# Patient Record
(demographics unavailable — no encounter records)

---

## 2024-10-21 NOTE — HISTORY OF PRESENT ILLNESS
[FreeTextEntry1] : 42 yo here for av. She has a mirena iud since  2019. She has no complaints, rare cramping.  She has occ irregular Bm, no h/o ibs

## 2024-10-21 NOTE — PHYSICAL EXAM
[Chaperone Declined] : Patient declined chaperone [Appropriately responsive] : appropriately responsive [Alert] : alert [No Acute Distress] : no acute distress [No Lymphadenopathy] : no lymphadenopathy [Soft] : soft [Non-tender] : non-tender [Non-distended] : non-distended [No HSM] : No HSM [No Lesions] : no lesions [No Mass] : no mass [Oriented x3] : oriented x3 [Examination Of The Breasts] : a normal appearance [No Masses] : no breast masses were palpable [Labia Majora] : normal [Labia Minora] : normal [IUD String] : an IUD string was noted [Normal] : normal [Uterine Adnexae] : normal [FreeTextEntry4] : brown blood noted in vault

## 2024-11-06 NOTE — ASSESSMENT
[FreeTextEntry1] : 41-year-old female with L5-S1 degenerative disc disease  I discussed with Kristina I believe her symptoms are related to disc desiccation at L5-S1 she had a prior MRI of 2016 that demonstrated some disc signal intensity loss this was about 8 years ago her x-rays today demonstrates decreased height of the L5-S1 disc space I believe she is suffering from some discogenic back pain and lumbar spondylosis.  This is likely cause of her back flareups and aching low back pain that occurs intermittently I believe physical therapy can help with prevention of these flareups as well as decreasing their severity and longevity A prescription was provided today Anti-inflammatories as needed I will see her back if her symptoms worsen or if they fail to improve we can consider more Onofre imaging

## 2024-11-06 NOTE — PHYSICAL EXAM
[de-identified] : CONSTITUTIONAL: Patient is a very pleasant individual who is well-nourished and appears stated age. PSYCHIATRIC: Alert and oriented times three and in no apparent distress, and participates with orthopedic evaluation well. HEAD: Atraumatic and nonsyndromic in appearance. EENT: No thyromegaly, EOMI. RESPIRATORY: Respiratory rate is regular, not dyspneic on examination. LYMPHATICS: There is no cervical or axillary lymphadenopathy. INTEGUMENTARY: Skin is clean, dry, and intact to bilateral lower extremities. VASCULAR: There is brisk capillary refill about the bilateral Lower Extremities with 2+ DP Pulse  Palpation: No tenderness in lower lumbar spine  Muscle Strength Testing: Hip Flexion: 5/5 B/L Knee Extension: 5/5 B/L Knee Flexion: 5/5 B/L Dorsiflexion: 5/5 B/L EHL: 5/5 B/L Plantarflexion: 5/5 B/L  Sensation: SILT L2-S1 B/L except: []  Reflexes: 2+ Quadriceps/Achilles  Gait: Normal gait w/o assistance Able to perform tandem gait Able to Heel Walk Able to Toe Walk  Special Testing:  Negative SLR BLLE Negative clonus BLLE  [de-identified] : Standing AP, lateral, flexion and extension radiographs of the lumbar spine performed on 11/6/2024 in the Radiology Department at [facility] [place] for the indication of low back pain are reviewed.  These studies demonstrate no osteoarthritis bilateral hips Lateral radiographs demonstrates well-maintained lordosis there is mild disc height loss at L5-S1
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
